# Patient Record
Sex: FEMALE | Race: WHITE
[De-identification: names, ages, dates, MRNs, and addresses within clinical notes are randomized per-mention and may not be internally consistent; named-entity substitution may affect disease eponyms.]

---

## 2019-11-04 ENCOUNTER — HOSPITAL ENCOUNTER (OUTPATIENT)
Dept: HOSPITAL 47 - RADXRMAIN | Age: 84
Discharge: HOME | End: 2019-11-04
Attending: FAMILY MEDICINE
Payer: MEDICARE

## 2019-11-04 DIAGNOSIS — S93.422D: Primary | ICD-10-CM

## 2019-11-04 NOTE — XR
EXAMINATION TYPE: XR ankle limited LT

 

DATE OF EXAM: 11/4/2019

 

CLINICAL HISTORY: Left ankle pain and swelling after fall

 

TECHNIQUE:  Frontal and lateral images of the left ankle were obtained.

 

COMPARISON: None.

 

FINDINGS: Fragmented avulsion fracture of the medial malleolus with pronounced overlying soft tissue 
swelling, also seen laterally. Small Achilles and plantar heel spurs. Talar dome is intact. Ankle mor
tise maintained alignment. No additional fracture seen.

 

IMPRESSION: Acute appearing fragmented avulsion fracture of the distal aspect of the medial malleolus
 in the region of the deltoid ligament. Pronounced overlying soft tissue swelling of the left ankle.

## 2020-07-13 ENCOUNTER — HOSPITAL ENCOUNTER (OUTPATIENT)
Dept: HOSPITAL 47 - RADCTMAIN | Age: 85
Discharge: HOME | End: 2020-07-13
Attending: FAMILY MEDICINE
Payer: MEDICARE

## 2020-07-13 DIAGNOSIS — Z88.1: ICD-10-CM

## 2020-07-13 DIAGNOSIS — I67.82: ICD-10-CM

## 2020-07-13 DIAGNOSIS — G31.1: Primary | ICD-10-CM

## 2020-07-13 DIAGNOSIS — Z91.048: ICD-10-CM

## 2020-07-13 PROCEDURE — 70470 CT HEAD/BRAIN W/O & W/DYE: CPT

## 2020-07-13 NOTE — CT
EXAMINATION TYPE: CT brain wo/w con

 

DATE OF EXAM: 7/13/2020

 

COMPARISON: MR brain 4/24/2013

 

HISTORY: Unsteady gait

 

CT DLP: 2262 mGycm

Automated exposure control for dose reduction was used.

 

CONTRAST: 

CT scan of the head is performed without and with IV Contrast, patient injected with 50 mL of Isovue 
300.

 

FINDINGS: 

There is no abnormal enhancing mass or midline shift identified.  The ventricles and sulci are within
 normal limits in size for patient's age, there is cortical atrophy, periventricular white matter anna
ws patchy low attenuation, there are cerebral vascular calcifications.  The globes are remarkable for
 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 
in the sphenoid sinus, axial image 12. No abnormal enhancement following contrast administration.

 

IMPRESSION: 

Age-related changes of atrophy and chronic small vessel ischemia. Sphenoid sinus disease.